# Patient Record
Sex: FEMALE | Race: OTHER
[De-identification: names, ages, dates, MRNs, and addresses within clinical notes are randomized per-mention and may not be internally consistent; named-entity substitution may affect disease eponyms.]

---

## 2020-10-29 NOTE — PRE-OP HX & PHY REPO 2 SIG
DATE OF ADMISSION:  2020

DATE OF SURGERY:  Scheduled for surgery on 2020.



HISTORY OF PRESENT ILLNESS:  The patient is a 60-year-old female, in

overall good health with invasive ductal carcinoma of the left breast.

The patient presented with a mass in the left breast upper outer quadrant

with imaging studies showing at 1 o'clock, 3 cm from the nipple, was a 1.5

x 1.0 x 1.2 cm mass.  Core biopsy revealed invasive ductal carcinoma and

ductal carcinoma in situ, estrogen and progesterone receptor positive,

HER2 negative.  Ki-67 15%.  The patient was seen in consultation with

Medical Oncology and recommendation was to proceed with surgery at this

time.



PAST MEDICAL HISTORY/MEDICATIONS:  Hypertension.



ALLERGIES:  None.



OPERATIONS:  None.



REVIEW OF SYSTEMS:   4, para 4.  She is menopausal.



PHYSICAL EXAMINATION:

GENERAL:  The patient is 5 feet 7 inches, 170 pounds.

VITAL SIGNS:  Stable.

HEENT:  Within normal limits.

LUNGS:  Clear.

HEART:  Regular rhythm.

BREASTS:  Moderate in size and ptotic.  There is a 1.5 cm mass in the upper

outer quadrant of the left breast at 1 o'clock 3 cm from the nipple.

There is no right breast mass.  There is no palpable axillary or

supraclavicular lymphadenopathy.

ABDOMEN:  Soft.

PELVIC:  Per primary care.

RECTAL:  Per primary care.

EXTREMITIES:  Without edema.

NEUROLOGIC:  Physiologic.



IMPRESSION:  Invasive ductal carcinoma, left breast.



PLAN:  Left breast partial mastectomy and left axillary lymph node

biopsy.



DISCUSSION:  I have had a full discussion with the patient regarding the

nature of her condition and the nature of the surgery including

alternatives, options, and risks including bleeding, infection, scarring

or distortion of the breast or nipple, need for treatment for neuralgia,

neuritis, and the potential need for additional treatment including

surgery and other treatments based on final pathology.  The patient

understands and agrees to proceed.









  ______________________________________________

  Lokesh Barnard M.D. DR:  VERONIKA

D:  10/29/2020 14:52

T:  10/29/2020 15:12

JOB#:  [id number]

CC:

## 2020-10-30 LAB
ADD MANUAL DIFF: NO
ANION GAP SERPL CALC-SCNC: 10 MMOL/L (ref 5–15)
APPEARANCE UR: CLEAR
APTT BLD: 26 SEC (ref 23–33)
APTT PPP: (no result) S
BASOPHILS NFR BLD AUTO: 0.9 % (ref 0–2)
BUN SERPL-MCNC: 18 MG/DL (ref 7–18)
CALCIUM SERPL-MCNC: 9.1 MG/DL (ref 8.5–10.1)
CHLORIDE SERPL-SCNC: 106 MMOL/L (ref 98–107)
CO2 SERPL-SCNC: 26 MMOL/L (ref 21–32)
CREAT SERPL-MCNC: 0.8 MG/DL (ref 0.55–1.3)
EOSINOPHIL NFR BLD AUTO: 0.5 % (ref 0–3)
ERYTHROCYTE [DISTWIDTH] IN BLOOD BY AUTOMATED COUNT: 12.7 % (ref 11.6–14.8)
GLUCOSE UR STRIP-MCNC: NEGATIVE MG/DL
HCT VFR BLD CALC: 41.6 % (ref 37–47)
HGB BLD-MCNC: 14 G/DL (ref 12–16)
INR PPP: 1 (ref 0.9–1.1)
KETONES UR QL STRIP: NEGATIVE
LEUKOCYTE ESTERASE UR QL STRIP: NEGATIVE
LYMPHOCYTES NFR BLD AUTO: 19.7 % (ref 20–45)
MCV RBC AUTO: 89 FL (ref 80–99)
MONOCYTES NFR BLD AUTO: 6.7 % (ref 1–10)
NEUTROPHILS NFR BLD AUTO: 72.1 % (ref 45–75)
NITRITE UR QL STRIP: NEGATIVE
PH UR STRIP: 6.5 [PH] (ref 4.5–8)
PLATELET # BLD: 168 K/UL (ref 150–450)
POTASSIUM SERPL-SCNC: 3.6 MMOL/L (ref 3.5–5.1)
PROT UR QL STRIP: NEGATIVE
RBC # BLD AUTO: 4.67 M/UL (ref 4.2–5.4)
SODIUM SERPL-SCNC: 142 MMOL/L (ref 136–145)
SP GR UR STRIP: 1 (ref 1–1.03)
UROBILINOGEN UR-MCNC: NORMAL MG/DL (ref 0–1)
WBC # BLD AUTO: 8.2 K/UL (ref 4.8–10.8)

## 2020-10-30 NOTE — DIAGNOSTIC IMAGING REPORT
Indication: Cough

 

Technique: 2 views of the chest

 

Comparison: None

 

Findings: Lungs and pleural spaces are clear. The heart size is normal.  The bones

are unremarkable. No significant interim change.

 

Impression: Negative

## 2020-11-02 ENCOUNTER — HOSPITAL ENCOUNTER (INPATIENT)
Dept: HOSPITAL 72 - SUR | Age: 60
LOS: 2 days | Discharge: HOME | DRG: 363 | End: 2020-11-04
Payer: MEDICAID

## 2020-11-02 VITALS — BODY MASS INDEX: 28.13 KG/M2 | WEIGHT: 175.05 LBS | HEIGHT: 66 IN

## 2020-11-02 VITALS — SYSTOLIC BLOOD PRESSURE: 167 MMHG | DIASTOLIC BLOOD PRESSURE: 83 MMHG

## 2020-11-02 VITALS — SYSTOLIC BLOOD PRESSURE: 163 MMHG | DIASTOLIC BLOOD PRESSURE: 81 MMHG

## 2020-11-02 VITALS — SYSTOLIC BLOOD PRESSURE: 137 MMHG | DIASTOLIC BLOOD PRESSURE: 69 MMHG

## 2020-11-02 VITALS — SYSTOLIC BLOOD PRESSURE: 136 MMHG | DIASTOLIC BLOOD PRESSURE: 77 MMHG

## 2020-11-02 VITALS — SYSTOLIC BLOOD PRESSURE: 151 MMHG | DIASTOLIC BLOOD PRESSURE: 70 MMHG

## 2020-11-02 VITALS — SYSTOLIC BLOOD PRESSURE: 145 MMHG | DIASTOLIC BLOOD PRESSURE: 61 MMHG

## 2020-11-02 VITALS — DIASTOLIC BLOOD PRESSURE: 89 MMHG | SYSTOLIC BLOOD PRESSURE: 149 MMHG

## 2020-11-02 VITALS — DIASTOLIC BLOOD PRESSURE: 83 MMHG | SYSTOLIC BLOOD PRESSURE: 165 MMHG

## 2020-11-02 VITALS — SYSTOLIC BLOOD PRESSURE: 162 MMHG | DIASTOLIC BLOOD PRESSURE: 76 MMHG

## 2020-11-02 VITALS — DIASTOLIC BLOOD PRESSURE: 69 MMHG | SYSTOLIC BLOOD PRESSURE: 137 MMHG

## 2020-11-02 DIAGNOSIS — C50.412: Primary | ICD-10-CM

## 2020-11-02 DIAGNOSIS — I10: ICD-10-CM

## 2020-11-02 DIAGNOSIS — K21.9: ICD-10-CM

## 2020-11-02 DIAGNOSIS — Z17.0: ICD-10-CM

## 2020-11-02 PROCEDURE — 0HBU0ZZ EXCISION OF LEFT BREAST, OPEN APPROACH: ICD-10-PCS

## 2020-11-02 PROCEDURE — 71046 X-RAY EXAM CHEST 2 VIEWS: CPT

## 2020-11-02 PROCEDURE — 93005 ELECTROCARDIOGRAM TRACING: CPT

## 2020-11-02 PROCEDURE — 85730 THROMBOPLASTIN TIME PARTIAL: CPT

## 2020-11-02 PROCEDURE — 07T60ZZ RESECTION OF LEFT AXILLARY LYMPHATIC, OPEN APPROACH: ICD-10-PCS

## 2020-11-02 PROCEDURE — 85610 PROTHROMBIN TIME: CPT

## 2020-11-02 PROCEDURE — 94150 VITAL CAPACITY TEST: CPT

## 2020-11-02 PROCEDURE — 80048 BASIC METABOLIC PNL TOTAL CA: CPT

## 2020-11-02 PROCEDURE — 85025 COMPLETE CBC W/AUTO DIFF WBC: CPT

## 2020-11-02 PROCEDURE — 94003 VENT MGMT INPAT SUBQ DAY: CPT

## 2020-11-02 PROCEDURE — 81003 URINALYSIS AUTO W/O SCOPE: CPT

## 2020-11-02 PROCEDURE — 36415 COLL VENOUS BLD VENIPUNCTURE: CPT

## 2020-11-02 RX ADMIN — SODIUM CHLORIDE SCH MLS/HR: 0.9 INJECTION INTRAVENOUS at 22:44

## 2020-11-02 RX ADMIN — SODIUM CHLORIDE SCH MLS/HR: 0.9 INJECTION INTRAVENOUS at 15:25

## 2020-11-02 RX ADMIN — DEXTROSE, SODIUM CHLORIDE, AND POTASSIUM CHLORIDE SCH MLS/HR: 5; .45; .15 INJECTION INTRAVENOUS at 12:35

## 2020-11-02 NOTE — NUR
NURSE HAND-OFF: 



Important Events on Shift: s/p Left breast partial mastectomy with axiliary lump node 
biopsy. 

Patient Status: full code/ stable condition. 

Diet: regular



Pending Orders: 

Pending Results/Labs:

Pending MD notification:



Latest Vital Signs: Temperature 97.4 , Pulse 67 , B/P 133 /77 , Respiratory Rate 16 , O2 SAT 
100 , Nasal Cannula, O2 Flow Rate 3.0 .  

Vital Sign Comment:  stable



Latest Baxter Fall Score: 20  

Fall Risk: Low Risk 

Safety Measures: Call light Within Reach, Bed Alarm Zone 1, Side Rails Side Rails x2, Bed 
position Low and Locked.

Fall Precautions: please assist patient for ambulation. 

Patient Fall Education



Report given to Yelena HALEY, pt in stable condition. 

- during my shift pt was able to void 800ml.

- JEANA drain out put: 2.5ml. 

- Started teaching for JEANA drain care.

## 2020-11-02 NOTE — NUR
NURSE NOTES:

167/83 BP 86HR noted. Called Dr. Barnard and said give Lisinopril 20mg first and observe 
the pt. Lisinopril doesn't reach the time and RN couldn't pull the med out. Per pharmacy, 
put one time order. Order entered and carried out.

## 2020-11-02 NOTE — ANETHESIA PREOPERATIVE EVAL
Anesthesia Pre-op PMH/ROS


General


Date of Evaluation:  Nov 2, 2020


Time of Evaluation:  07:06


Anesthesiologist:  Dahiana


ASA Score:  ASA 2


Mallampati Score


Class I : Soft palate, uvula, fauces, pillars visible


Class II: Soft palate, uvula, fauces visible


Class III: Soft palate, base of uvula visible


Class IV: Only hard plate visible


Mallampati Classification:  Class II


Surgeon:  Akil


Diagnosis:  L breast CA


Surgical Procedure:  L partial mastectomy with axillary l/n dissection


Anesthesia History:  none


Family History:  no anesthesia problems


Allergies:  


Coded Allergies:  


     No Known Allergies (Unverified , 10/30/20)


Medications:  see eMAR


Patient NPO?:  Yes





Past Medical History


Cardiovascular:  Reports: HTN; 


   Denies: CAD, MI, valve dz, arrhythmia, other


Pulmonary:  Denies: asthma, COPD, FRANSICO, other


Gastrointestinal/Genitourinary:  Reports: GERD - mild


Neurologic/Psychiatric:  Reports: depression/anxiety; 


   Denies: dementia, CVA, TIA, other


Endocrine:  Reports: DM - borderline; 


   Denies: hypothyroidism, steroids, other


HEENT:  Denies: cataract (L), cataract (R), glaucoma, [place]well (L), [place]well (R), other


Hematology/Immune:  Denies: anemia, DVT, bleeding disorder, other


Musculoskeletal/Integumentary:  Reports: OA; 


   Denies: RA, DJD, DDD, edema, other


PMH Narrative:


as above


PSxH Narrative:


see H&P





Anesthesia Pre-op Phys. Exam


Physician Exam





Last Vital Signs








  Date Time  Temp Pulse Resp B/P (MAP) Pulse Ox O2 Delivery O2 Flow Rate FiO2


 


11/2/20 05:51      Room Air  


 


11/2/20 05:51 98.0 89 18 162/76 98   








Constitutional:  NAD


Neurologic:  CN 2-12 intact


Cardiovascular:  RRR, no M/R/G


Respiratory:  CTA


Gastrointestinal:  S/NT/ND





Airway Exam


Mallampati Score:  Class II


MO:  full


Neck:  stiff


ROM:  limited


Teeth:  missing


Dentures:  no upper, no lower





Anesthesia Pre-op A/P


Labs


see chart





Studies


Pre-op Studies:  EKG - NSR, CXR - WNL





Risk Assessment & Plan


Assessment:


ASA 2


Plan:


GA with LMA


Status Change Before Surgery:  No





Pre-Antibiotics


Drug:  Ancef 1gr


Given Within 1 Hr of Incision:  Yes


Time Given:  07:50











Srinivas Talbot MD             Nov 2, 2020 07:10

## 2020-11-02 NOTE — NUR
NURSE NOTES:

Received report from SUDHA Dixon. AAO x 4, on NC 2L. I/S at bedside. JEANA drain on L breast and 
small serosanguineous output noted. Denies pain. IV site intact and running IVF. No labored 
breathing. Dressing on surgical site d/c/i. Bed locked, lowest position, alarm on, side 
rails up, call light within reach. Will continue to monitor.

## 2020-11-02 NOTE — 48 HOUR POST ANESTHESIA EVAL
Post Anesthesia Evaluation


Procedure:  L breast partial masyectomy with axillary l/n dissection


Date of Evaluation:  Nov 2, 2020


Time of Evaluation:  11:40


Blood Pressure Systolic:  133


0:  77


Pulse Rate:  67


Respiratory Rate:  16


Temperature (Fahrenheit):  97.4


O2 Sat by Pulse Oximetry:  100


Airway:  patent


Nausea:  No


Vomiting:  No


Pain Intensity:  2


Hydration Status:  adequate


Cardiopulmonary Status:


Stable


Mental Status/LOC:  patient returned to baseline


Follow-up Care/Observations:


0


Post-Anesthesia Complications:


0


Follow-up care needed:  N/A











Bryon Mckeon MD                 Nov 2, 2020 11:41

## 2020-11-02 NOTE — BRIEF OPERATIVE NOTE
Immediate Post Operative Note


Operative Note


Pre-op Diagnosis:


invasive ductal carcinoma left breast


Procedure:


left breast partial mastectomy and left axillary lymph node biopsy


Post-op Diagnosis:


same


Findings:  consistent w/pre-op dx studies


Surgeon:  maeve


Anesthesiologist:  michael


Anesthesia:  general


Specimen:  yes - left breast cancer, left axillary lymph nodes


Complications:  none


Condition:  stable


Fluids:  see anesthesia record


Estimated Blood Loss:  minimal


Drains:  JEANA


Implant(s) used?:  No











Lokesh Barnard MD              Nov 2, 2020 09:20

## 2020-11-02 NOTE — OPERATIVE NOTE - DICTATED
DATE OF OPERATION:  11/02/2020

SURGEON:  Lokesh Barnard MD.



ASSISTANT:  None.



ANESTHESIOLOGIST:  Srinivas Talbot MD.



TYPE OF ANESTHESIA:  General.



PREOPERATIVE DIAGNOSIS:  Invasive ductal carcinoma, left breast.



POSTOPERATIVE DIAGNOSIS:  Invasive ductal carcinoma, left breast.



OPERATION PERFORMED:  Left breast partial mastectomy and left axillary

lymph node biopsy.



DESCRIPTION OF PROCEDURE:  The patient was taken to the operating room and

under general anesthesia with sequential compression device stockings in

place, she was prepped and draped in usual fashion.  The mass was located

in the upper outer quadrant of the left breast near the areola.  A

curvilinear incision was made and flaps dissected circumferentially.  Mass

was resected with suture markers placed anterior, superior, and medial.

The pathology inspected the tissue and then I took additional superior,

lateral and medial margins.  Hemostasis was carefully achieved with

cautery.  The field irrigated with sterile water and antibiotic.  After

assuring that hemostasis was secured, the incision was closed with

interrupted 3-0 Vicryl deep dermal subcutaneous sutures followed by

continuous 4-0 Monocryl subcuticular suture.  A vertical left axillary

incision was made achieving hemostasis with cautery and incising the

clavipectoral fascia.  An obviously enlarged lymph gland was removed with

a lower level dissection using the Sazneounderbeat vessel sealing

electrosurgical device.  The tissue was given to the pathologist.  Through

a separate stab incision inferolaterally, a 10 flat Kip drain was placed

into the axilla and sutured to the skin with 2-0 nylon skin suture.  The

field was irrigated and hemostasis was seen to be secured.  The

clavipectoral fascia was closed with interrupted 3-0 Vicryl, the

subcutaneous tissues closed with interrupted 3-0 Vicryl, and skin closed

with continuous 4-0 Monocryl subcuticular suture.  Mastisol and half-inch

Steri-Strips were applied to both incisions followed by dry sterile

dressings.  Final sponge and needle counts were correct.  The patient

tolerated the procedure well and left the operating room in good

condition.









  ______________________________________________

  Lokesh Barnard M.D.





DR:  VERONIKA

D:  11/02/2020 09:30

T:  11/02/2020 09:45

JOB#:  [id number]

CC:

## 2020-11-02 NOTE — PRE-PROCEDURE NOTE/ATTESTATION
Pre-Procedure Note/Attestation


Complete Prior to Procedure


Planned Procedure:  left


Procedure Narrative:


left breast partial mastectomy and left axillary lymph node biopsy





Indications for Procedure


Pre-Operative Diagnosis:


invasive ductal carcinoma left breast





Attestation


I attest that I discussed the nature of the procedure; its benefits; risks and 

complications; and alternatives (and the risks and benefits of such 

alternatives), prior to the procedure, with the patient (or the patient's legal 

representative).





I attest that, if there was a reasonable possibility of needing a blood 

transfusion, the patient (or the patient's legal representative) was given the 

[facility] standardized written summary, pursuant 

to the Damián Kylie Blood Safety Act ([place] Health and Safety Code # 1645, as 

amended).





I attest that I re-evaluated the patient just prior to the surgery and that 

there has been no change in the patient's H&P, except as documented below: none











Lokesh Barnard MD              Nov 2, 2020 07:32

## 2020-11-02 NOTE — IMMEDIATE POST-OP EVALUATION
Immediate Post-Op Evalulation


Immediate Post-Op Evalulation


Procedure:  L breast partial masyectomy with axillary l/n dissection


Date of Evaluation:  Nov 2, 2020


Time of Evaluation:  09:26


IV Fluids:  1000


Blood Products:  none


Estimated Blood Loss:  <50


Urinary Output:  none


Blood Pressure Systolic:  146


Blood Pressure Diastolic:  83


Pulse Rate:  86


Respiratory Rate:  20


O2 Sat by Pulse Oximetry:  99


Temperature (Fahrenheit):  97.6


Pain Score (1-10):  1


Nausea:  No


Vomiting:  No


Complications


none


Patient Status:  reacts, patent, none


Hydration Status:  adequate











Srinivas Talbot MD             Nov 2, 2020 09:27

## 2020-11-02 NOTE — NUR
NURSE NOTES:

Received report from Kim RN, pt a/a/o x4 laying in bed with no signs of distress or other 
issues at this time. surgical dressing dry and intact. JEANA drain in place, no out put noted 
at this time. RN will review orders and will carry on as indicated. call light within reach, 
bed in lowest position. side rales up x2. I will f/u as needed.

## 2020-11-03 VITALS — SYSTOLIC BLOOD PRESSURE: 135 MMHG | DIASTOLIC BLOOD PRESSURE: 66 MMHG

## 2020-11-03 VITALS — SYSTOLIC BLOOD PRESSURE: 123 MMHG | DIASTOLIC BLOOD PRESSURE: 82 MMHG

## 2020-11-03 VITALS — DIASTOLIC BLOOD PRESSURE: 78 MMHG | SYSTOLIC BLOOD PRESSURE: 146 MMHG

## 2020-11-03 VITALS — DIASTOLIC BLOOD PRESSURE: 79 MMHG | SYSTOLIC BLOOD PRESSURE: 114 MMHG

## 2020-11-03 VITALS — SYSTOLIC BLOOD PRESSURE: 154 MMHG | DIASTOLIC BLOOD PRESSURE: 95 MMHG

## 2020-11-03 VITALS — SYSTOLIC BLOOD PRESSURE: 144 MMHG | DIASTOLIC BLOOD PRESSURE: 78 MMHG

## 2020-11-03 RX ADMIN — DEXTROSE, SODIUM CHLORIDE, AND POTASSIUM CHLORIDE SCH MLS/HR: 5; .45; .15 INJECTION INTRAVENOUS at 01:21

## 2020-11-03 RX ADMIN — LISINOPRIL SCH MG: 20 TABLET ORAL at 08:39

## 2020-11-03 NOTE — NUR
NURSE NOTES:

Received report from Erika RN, pt a/a/o x4 laying in bed with no signs of distress other 
issues at this time. surgical dressing dry and intact. JEANA drain in place, over night out put 
8ml. per report pt was able to void with  no issues and was able to stand with assist. call 
light within reach, bed in lowest position, side rales up x2. I will f/u as needed.

## 2020-11-03 NOTE — NUR
NURSE NOTES:

Urine output 1050cc, JEANA drain 8cc with serosanguineous noted. Instruction for JEANA care and 
drain given.

## 2020-11-03 NOTE — NUR
NURSE NOTES:

thought patient and daughter about JEANA drain care. patient and patient's daughter were able 
to return demonstration. they are also thought in how to record JEANA drain out put. RN given 
paper to record out put as well as containers, gauzes 4x4 and paper tape for dressing 
changes if needed. I will f/u as needed.

## 2020-11-03 NOTE — NUR
NURSE HAND-OFF: 



Important Events on Shift:

Patient Status: stable condition/full code

Diet: Regular 



Pending Orders: 

Pending Results/Labs:[]

Pending MD notification:[]



Latest Vital Signs: Temperature 98.1 , Pulse 82 , B/P 154 /95 , Respiratory Rate 18 , O2 SAT 
98 , Room Air, O2 Flow Rate 3.0 .  

Vital Sign Comment: stable



Latest Baxter Fall Score: 20  

Fall Risk: Low Risk 

Safety Measures: Call light Within Reach, Bed Alarm Zone 1, Side Rails Side Rails x2, Bed 
position Low and Locked.

Fall Precautions: 

Yellow Socks

Door Sign

Patient Fall Education



Report given to Leida HALEY. pt in stable condition.

- during my shift pt was able to ambulate around the unit with steady gait.

- patient and pt's daughter were thought in how to manage JEANA drain. patient and pt's 
daughter were able to demonstrate back JEANA care. supplies were given to patient. 

- patient's daughter took RX for home medication, she stated that she will take it to her 
regular phx.

- plan to dc home tomorrow.

## 2020-11-03 NOTE — NUR
NURSE NOTES:

Received report from SUDHA Dixon. Rounds done. Patient alert, oriented, no distress noted. 
Left breast dressing/surgical bra intact and dry. JEANA patent, to bulb suction, draining clear 
serosanguineous fluid. Patient states feels comfortable taking care of JEANA, will reassess JEANA 
care during this shift. Patient states feels pain only when moving and rates it 7/10 but at 
this time she refused pain medication. Encouraged to call as needed. Bed in low position, 
locked, side rails up x2, call light within reach. Will continue to monitor.

## 2020-11-03 NOTE — NUR
NURSE HAND-OFF: 



Important Events on Shift:JEANA drain 8cc, HTN

Patient Status: stable

Diet: reg



Pending Orders: N

Pending Results/Labs:N

Pending MD notification:N



Latest Vital Signs: Temperature 98.8 , Pulse 77 , B/P 144 /78 , Respiratory Rate 18 , O2 SAT 
97 , Room Air, O2 Flow Rate 3.0 .  

Vital Sign Comment: []



Latest Baxter Fall Score: 20  

Fall Risk: Low Risk 

Safety Measures: Call light Within Reach, Bed Alarm Zone 1, Side Rails Side Rails x2, Bed 
position Low and Locked.

Fall Precautions: 

Yellow Socks

Door Sign

Patient Fall Education


-------------------------------------------------------------------------------

Addendum: 11/03/20 at 0714 by LAURY CARMONA RN RN

-------------------------------------------------------------------------------

HAND-OFF: 

Report given to Destiny.

## 2020-11-03 NOTE — GENERAL PROGRESS NOTE
Progress Note


Progress Note


AVSS  c/o headache/her head feels heavy.  Ambulates only to bathroom.  Learning 

care of JEANA drain. Pain in axilla with movement


Left breast and axilla incisions clean with intact steristrips


JEANA 8cc


Imp: Stable but not able to ambulate independently


Plan; continue in-patient status another 24 hours


         teach care of JEANA drain


        Rx Norco 5/425 #30 given to daughter











Lokesh Barnard MD              Nov 3, 2020 11:47

## 2020-11-03 NOTE — NUR
NURSE NOTES:

RX for home medications given to patient's daughter, per daughter she will go to her regular 
phx to fill up prescription.  Will f/u as needed.

## 2020-11-04 VITALS — DIASTOLIC BLOOD PRESSURE: 86 MMHG | SYSTOLIC BLOOD PRESSURE: 168 MMHG

## 2020-11-04 VITALS — DIASTOLIC BLOOD PRESSURE: 93 MMHG | SYSTOLIC BLOOD PRESSURE: 161 MMHG

## 2020-11-04 VITALS — DIASTOLIC BLOOD PRESSURE: 84 MMHG | SYSTOLIC BLOOD PRESSURE: 126 MMHG

## 2020-11-04 VITALS — DIASTOLIC BLOOD PRESSURE: 92 MMHG | SYSTOLIC BLOOD PRESSURE: 162 MMHG

## 2020-11-04 VITALS — DIASTOLIC BLOOD PRESSURE: 83 MMHG | SYSTOLIC BLOOD PRESSURE: 140 MMHG

## 2020-11-04 RX ADMIN — LISINOPRIL SCH MG: 20 TABLET ORAL at 08:03

## 2020-11-04 NOTE — NUR
NURSE NOTES:

Report received from Leida HALEY, rounds made. Patient AOx4, calm, dangling at bedside, eating 
breakfast, tolerating regular diet, no NV. RH saline lock, site asymptomatic. Left 
axillary/breast dressing CDI, wearing surgical bra, left JEANA in place, minimal amount of 
serosanguineous output noted, will reinforce JEANA care. Left hand/arm warm, wiggles, pulse 
palpable, no NT, able to move arm up and down. Denies pain, SOB. Call light in reach, bed in 
lowest position, will continue to monitor. 

-------------------------------------------------------------------------------

Addendum: 11/04/20 at 0753 by Bell Francis RN

-------------------------------------------------------------------------------

Primary language, Tamazight.

## 2020-11-04 NOTE — GENERAL PROGRESS NOTE
Progress Note


Progress Note


AVSS  Feeling much better, no head feeling heavy complaints, ambulates 

independently now


lwft breast and axilla incisions clean and dry with intact steristrips


JEANA draining small amount


Imp: Improved


Plan; discharge


instructions/limitations/supplies discussed/provided


f/u 11/10 in office











Lokesh Barnard MD              Nov 4, 2020 08:29

## 2020-11-04 NOTE — NUR
NURSE HAND-OFF: 



Important Events on Shift: reinforced JEANA care education

Patient Status: stable

Diet: reg



Pending Orders: []

Pending Results/Labs:[]

Pending MD notification:[]



Latest Vital Signs: Temperature 98.9 , Pulse 88 , B/P 161 /93 , Respiratory Rate 16 , O2 SAT 
99 , Room Air, O2 Flow Rate 3.0 .  

Vital Sign Comment: []



Latest Baxter Fall Score: 20  

Fall Risk: Low Risk 

Safety Measures: Call light Within Reach, Bed Alarm Zone 1, Side Rails Side Rails x2, Bed 
position Low and Locked.

Fall Precautions: 

Yellow Socks

Door Sign

Patient Fall Education



Report given to SUDHA Luu. Beck osborne

## 2020-11-04 NOTE — CARDIOLOGY REPORT
--------------- APPROVED REPORT --------------





EKG Measurement

Heart [id number]

MA [id number]

[id number]

[id number]

[id number]



<Conclusion>

Normal sinus rhythm with sinus arrhythmia

Incomplete right bundle branch block

Borderline ECG

## 2020-11-04 NOTE — NUR
NURSE NOTES:

JEANA care education reviewed and observed, patient doing very well. States slept on and off 
for about 6 hours last night and feels better.

## 2020-11-04 NOTE — NUR
NURSE NOTES:

Discharge instructions and prescription x1 (original already taken to patient's pharmacy by 
daughter yesterday) reviewed with patient and daughter, verbalized understanding. ID 
bracelet removed. IV discontinued, no active bleeding. Reinforced JEANA care, proper 
handwashing and infection control, daughter verbalized understanding. Daughter translating 
for patient. Daughter demonstrated proper JEANA care, correctly. Provided additional 4x4 gauze, 
paper tape, measuring cups, gloves. All belongings, discharge instructions, supplies given 
to daughter. Patient ambulated down to lobby with RN, in stable condition. Discharged home 
at 1400.

## 2020-11-04 NOTE — NUR
NURSE NOTES:

Dr. Barnard updated on patient current status, /93 and patient that denies pain and 
head fullness has subsided, no further orders.

## 2020-11-05 NOTE — DISCHARGE SUMMARY
Discharge Summary


Hospital Course


Date of Admission


Nov 2, 2020 at 10:41


Date of Discharge


Nov 4, 2020 at 14:00


Admitting Diagnosis


Invasive ductal carcinoma, left breast.





Reason for Hospitalization:  elective surgery


HPI


Luly Ruiz is a 60 year old female who was admitted on Nov 2, 2020 at

10:41 for Invasive ductal carcinoma, left breast.


Patient was admitted for elective surgery


Procedures


s/p 11/2/20 by DR Barnard


Left breast partial mastectomy and left axillary lymph node biopsy


Hospital Course


status post surgery 


course of recovery uneventful 


initially IV fluids


s/p perioperative antibiotic 


left breast and axilla incision remained clean with intact Steri-Strips 


patient was instructed care of the JEANA drain 


JEANA output was closely monitored 


patient reported pain in axilla with movement 


pain management was addressed , and pain was  controlled 


remained hemodynamically stable 


ambulated  


use of incentive spirometry was  encouraged while in the bed 


tolerated diet , IV fluids discontinued 


GI prophylaxis provided 


antiemetics were on board as needed 


blood pressure was managed with Lisinopril and remained stable 


voided freely


bowel regimen instituted 


patient was stable for discharge 


instruction/limitations/supplies discussed/provided 


prescription for Norco given to her daughter


follow-up  with surgeon in the office on 11/10








FINAL DIAGNOSES


Invasive ductal carcinoma, left breast.


s/p Left breast partial mastectomy and left axillary lymph node biopsy 


HTN





Discharge Medications


Continued Medications:  


Hydrocodone Bit/Acetaminophen 5-325* (Norco 5-325 Tablet*) 1 Each Tablet


1 TAB ORAL Q4H PRN for For Pain, #30 TAB











Discharge


Condition Upon Discharge:  stable


Discharge Vital Signs





Last Vital Signs








  Date Time  Temp Pulse Resp B/P (MAP) Pulse Ox O2 Delivery O2 Flow Rate FiO2


 


11/4/20 12:00 98.1 81 18 162/92 (115) 98   


 


11/4/20 09:00      Room Air  


 


11/2/20 10:45       3.0 








Discharge Disposition


Patient was discharged  home





Discharge Instructions


Discharge Instructions


Special Instructions


I have been assigned to complete a D/C Summary on this account. I was not 

involved in the patient management











Lela Diana NP                 Nov 5, 2020 17:15